# Patient Record
Sex: FEMALE | Race: WHITE
[De-identification: names, ages, dates, MRNs, and addresses within clinical notes are randomized per-mention and may not be internally consistent; named-entity substitution may affect disease eponyms.]

---

## 2019-10-17 ENCOUNTER — HOSPITAL ENCOUNTER (EMERGENCY)
Dept: HOSPITAL 63 - ER | Age: 29
Discharge: HOME | End: 2019-10-17
Payer: COMMERCIAL

## 2019-10-17 VITALS
SYSTOLIC BLOOD PRESSURE: 95 MMHG | SYSTOLIC BLOOD PRESSURE: 95 MMHG | DIASTOLIC BLOOD PRESSURE: 60 MMHG | DIASTOLIC BLOOD PRESSURE: 60 MMHG

## 2019-10-17 VITALS — WEIGHT: 116.62 LBS | BODY MASS INDEX: 19.43 KG/M2 | HEIGHT: 65 IN
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
## 2019-10-17 DIAGNOSIS — F41.8: ICD-10-CM

## 2019-10-17 DIAGNOSIS — T42.4X4A: Primary | ICD-10-CM

## 2019-10-17 LAB
ACETAMIN: < 2 MCG/ML (ref 10–30)
ALBUMIN SERPL-MCNC: 4 G/DL (ref 3.4–5)
ALBUMIN/GLOB SERPL: 1.1 {RATIO} (ref 1–1.7)
ALP SERPL-CCNC: 57 U/L (ref 46–116)
ALT SERPL-CCNC: 9 U/L (ref 14–59)
AMPHETAMINE/METHAMPHETAMINE: (no result)
ANION GAP SERPL CALC-SCNC: 9 MMOL/L (ref 6–14)
APTT PPP: YELLOW S
AST SERPL-CCNC: 13 U/L (ref 15–37)
BACTERIA #/AREA URNS HPF: 0 /HPF
BARBITURATES UR-MCNC: (no result) UG/ML
BASOPHILS # BLD AUTO: 0 X10^3/UL (ref 0–0.2)
BASOPHILS NFR BLD: 1 % (ref 0–3)
BENZODIAZ UR-MCNC: (no result) UG/L
BILIRUB SERPL-MCNC: 0.8 MG/DL (ref 0.2–1)
BILIRUB UR QL STRIP: (no result)
BUN/CREAT SERPL: 13 (ref 6–20)
CA-I SERPL ISE-MCNC: 9 MG/DL (ref 7–20)
CALCIUM SERPL-MCNC: 9.2 MG/DL (ref 8.5–10.1)
CANNABINOIDS UR-MCNC: (no result) UG/L
CHLORIDE SERPL-SCNC: 102 MMOL/L (ref 98–107)
CO2 SERPL-SCNC: 28 MMOL/L (ref 21–32)
COCAINE UR-MCNC: (no result) NG/ML
CREAT SERPL-MCNC: 0.7 MG/DL (ref 0.6–1)
EOSINOPHIL NFR BLD: 0 X10^3/UL (ref 0–0.7)
EOSINOPHIL NFR BLD: 1 % (ref 0–3)
ERYTHROCYTE [DISTWIDTH] IN BLOOD BY AUTOMATED COUNT: 13.1 % (ref 11.5–14.5)
ETHANOL SERPL-MCNC: < 10 MG/DL (ref 0–10)
FIBRINOGEN PPP-MCNC: CLEAR MG/DL
GFR SERPLBLD BASED ON 1.73 SQ M-ARVRAT: 97.6 ML/MIN
GLOBULIN SER-MCNC: 3.7 G/DL (ref 2.2–3.8)
GLUCOSE SERPL-MCNC: 91 MG/DL (ref 70–99)
GLUCOSE UR STRIP-MCNC: (no result) MG/DL
HCT VFR BLD CALC: 41.7 % (ref 36–47)
HGB BLD-MCNC: 13.9 G/DL (ref 12–15.5)
LYMPHOCYTES # BLD: 1.3 X10^3/UL (ref 1–4.8)
LYMPHOCYTES NFR BLD AUTO: 24 % (ref 24–48)
MAGNESIUM SERPL-MCNC: 1.8 MG/DL (ref 1.8–2.4)
MCH RBC QN AUTO: 30 PG (ref 25–35)
MCHC RBC AUTO-ENTMCNC: 33 G/DL (ref 31–37)
MCV RBC AUTO: 89 FL (ref 79–100)
METHADONE SERPL-MCNC: (no result) NG/ML
MONO #: 0.4 X10^3/UL (ref 0–1.1)
MONOCYTES NFR BLD: 8 % (ref 0–9)
NEUT #: 3.7 X10^3UL (ref 1.8–7.7)
NEUTROPHILS NFR BLD AUTO: 67 % (ref 31–73)
NITRITE UR QL STRIP: (no result)
OPIATES UR-MCNC: (no result) NG/ML
PCP SERPL-MCNC: (no result) MG/DL
PLATELET # BLD AUTO: 159 X10^3/UL (ref 140–400)
POTASSIUM SERPL-SCNC: 3.5 MMOL/L (ref 3.5–5.1)
PROT SERPL-MCNC: 7.7 G/DL (ref 6.4–8.2)
RBC # BLD AUTO: 4.66 X10^6/UL (ref 3.5–5.4)
RBC #/AREA URNS HPF: (no result) /HPF (ref 0–2)
SALIC: 1.8 MG/DL (ref 2.8–20)
SODIUM SERPL-SCNC: 139 MMOL/L (ref 136–145)
SP GR UR STRIP: <=1.005
SQUAMOUS #/AREA URNS LPF: (no result) /LPF
UROBILINOGEN UR-MCNC: 0.2 MG/DL
WBC # BLD AUTO: 5.5 X10^3/UL (ref 4–11)
WBC #/AREA URNS HPF: (no result) /HPF (ref 0–4)

## 2019-10-17 PROCEDURE — 36415 COLL VENOUS BLD VENIPUNCTURE: CPT

## 2019-10-17 PROCEDURE — 80307 DRUG TEST PRSMV CHEM ANLYZR: CPT

## 2019-10-17 PROCEDURE — 96360 HYDRATION IV INFUSION INIT: CPT

## 2019-10-17 PROCEDURE — 83735 ASSAY OF MAGNESIUM: CPT

## 2019-10-17 PROCEDURE — 85025 COMPLETE CBC W/AUTO DIFF WBC: CPT

## 2019-10-17 PROCEDURE — G0480 DRUG TEST DEF 1-7 CLASSES: HCPCS

## 2019-10-17 PROCEDURE — 99285 EMERGENCY DEPT VISIT HI MDM: CPT

## 2019-10-17 PROCEDURE — 93005 ELECTROCARDIOGRAM TRACING: CPT

## 2019-10-17 PROCEDURE — 96361 HYDRATE IV INFUSION ADD-ON: CPT

## 2019-10-17 PROCEDURE — 80329 ANALGESICS NON-OPIOID 1 OR 2: CPT

## 2019-10-17 PROCEDURE — 81001 URINALYSIS AUTO W/SCOPE: CPT

## 2019-10-17 PROCEDURE — 84702 CHORIONIC GONADOTROPIN TEST: CPT

## 2019-10-17 PROCEDURE — 80053 COMPREHEN METABOLIC PANEL: CPT

## 2019-10-17 NOTE — EKG
[facility] [address], [place] [address]

Test Date:    2019-10-17               Test Time:    10:47:16

Pat Name:     WINSTON HESTER           Department:   

Patient ID:   [id number]           Room:          

Gender:       F                        Technician:   

:          [id number]               Requested By: JADYN AIKEN

Order Number: [id number]            Reading MD:   Josesito De La Paz MD

                                 Measurements

Intervals                              Axis          

Rate:         88                       P:            65

MI:           188                      QRS:          96

QRSD:         90                       T:            58

QT:           364                                    

QTc:          444                                    

                           Interpretive Statements

SINUS RHYTHM



Electronically Signed On 10- 15:42:01 CDT by Josesito De La Paz MD

## 2019-10-17 NOTE — PHYS DOC
Adult General


Chief Complaint


Chief Complaint:  ANXIETY/PANIC ATTACK





HPI


HPI





Patient is a [age] year old [sex] who presents with []





Review of Systems


Review of Systems





Constitutional: Denies fever or chills []


Eyes: Denies change in visual acuity, redness, or eye pain []


HENT: Denies nasal congestion or sore throat []


Respiratory: Denies cough or shortness of breath []


Cardiovascular: No additional information not addressed in HPI []


GI: Denies abdominal pain, nausea, vomiting, bloody stools or diarrhea []


: Denies dysuria or hematuria []


Musculoskeletal: Denies back pain or joint pain []


Integument: Denies rash or skin lesions []


Neurologic: Denies headache, focal weakness or sensory changes []


Endocrine: Denies polyuria or polydipsia []





All other systems were reviewed and found to be within normal limits, except as 

documented in this note.





Current Medications


Current Medications





Current Medications








 Medications


  (Trade)  Dose


 Ordered  Sig/Frank  Start Time


 Stop Time Status Last Admin


Dose Admin


 


 Sodium Chloride  1,000 ml @ 


 1,000 mls/hr  1X  ONCE  10/17/19 10:45


 10/17/19 11:44 UNV  














Physical Exam


Physical Exam





Constitutional: Well developed, well nourished, no acute distress, non-toxic 

appearance. []


HENT: Normocephalic, atraumatic, bilateral external ears normal, oropharynx sal

st, no oral exudates, nose normal. []


Eyes: PERRLA, EOMI, conjunctiva normal, no discharge. [] 


Neck: Normal range of motion, no tenderness, supple, no stridor. [] 


Cardiovascular:Heart rate regular rhythm, no murmur []


Lungs & Thorax:  Bilateral breath sounds clear to auscultation []


Abdomen: Bowel sounds normal, soft, no tenderness, no masses, no pulsatile 

masses. [] 


Skin: Warm, dry, no erythema, no rash. [] 


Back: No tenderness, no CVA tenderness. [] 


Extremities: No tenderness, no cyanosis, no clubbing, ROM intact, no edema. [] 


Neurologic: Alert and oriented X 3, normal motor function, normal sensory 

function, no focal deficits noted. []


Psychologic: Affect normal, judgement normal, mood normal. []





EKG


EKG


@1047 NSR at 88bpm, NO ST elevation, incomplete RBBB, QRS 90ms, QT/QTc 364/444ms





Radiology/Procedures


Radiology/Procedures


[]





Course & Med Decision Making


Course & Med Decision Making


Pertinent Labs and Imaging studies reviewed. (See chart for details)





[]





Dragon Disclaimer


Dragon Disclaimer


This electronic medical record was generated, in whole or in part, using a voice

 recognition dictation system.





Departure


Departure:


Impression:  


   Primary Impression:  


   Anxiety and depression


   Additional Impression:  


   Benzodiazepine overdose of undetermined intent


Disposition:  01 HOME, SELF-CARE


Condition:  STABLE


Referrals:  


ROLAN PEREZ MD (PCP)


Patient Instructions:  Anxiety and Panic Attacks, Easy-to-Read, Depression, 

Adult, Easy-to-Read, Overdose, Adult, Suicidal Feelings, How to Help Yourself





Additional Instructions:  


Please take your medication as prescribed.  Please follow closely with your 

mental health professionals.





Problem Qualifiers








   Additional Impression:  


   Benzodiazepine overdose of undetermined intent


   Encounter type:  initial encounter  Qualified Codes:  T42.4X4A - Poisoning by

    benzodiazepines, undetermined, initial encounter








JADYN AIKEN DO             Oct 17, 2019 10:43 I have forwarded a prescription for Norvasc 2.5 mg tablets, one tablet twice a day for 3 weeks and then 2 tablets twice a day.

## 2020-09-18 ENCOUNTER — HOSPITAL ENCOUNTER (OUTPATIENT)
Dept: HOSPITAL 63 - US | Age: 30
Discharge: HOME | End: 2020-09-18
Attending: SPECIALIST
Payer: COMMERCIAL

## 2020-09-18 DIAGNOSIS — R22.43: Primary | ICD-10-CM

## 2020-09-18 PROCEDURE — 71046 X-RAY EXAM CHEST 2 VIEWS: CPT

## 2020-09-18 PROCEDURE — 93970 EXTREMITY STUDY: CPT

## 2020-09-18 NOTE — RAD
EXAM: CHEST PA   LATERAL

 

INDICATION: Reason: EDEMA / Spl. Instructions:  / History: .

 

TECHNIQUE: PA and lateral views

 

COMPARISON: None

 

FINDINGS:

 

The heart size is normal.

 

The great vessels appear unremarkable.

 

There is no hilar or mediastinal mass.

 

The lungs are clear.

 

There is no pleural effusion or pneumothorax.

 

There are no significant osseous abnormalities.

 

Bilateral breast implants are incidentally noted.

 

IMPRESSION:

 

No active cardiopulmonary disease.

 

Electronically signed by: Matilda Spangler MD (9/18/2020 4:00 PM) 

[id number]

## 2020-09-18 NOTE — RAD
EXAMINATION: VENOUS LOWER EXT BILATERAL (LOWER EXTREMITY VENOUS 

ULTRASOUND)

 

CLINICAL HISTORY: Bilateral lower extremity edema

 

TECHNIQUE: Sonographic grayscale images obtained of the bilateral lower 

extremity deep venous systems with color flow Doppler, compression, and 

augmentation techniques as indicated.  Images obtained and stored in a 

permanent archive.

 

COMPARISON:  None

 

 

FINDINGS:  

 

RIGHT:

No evidence of absent flow or incompressibility within the common femoral 

vein, femoral vein, or popliteal vein.  Visualized calf veins appear 

patent on limited evaluation.

 

LEFT:

No evidence of absent flow or incompressibility within common femoral 

vein, femoral vein, or popliteal vein.  Visualized calf veins appear 

patent on limited evaluation.

 

 

IMPRESSION:  

 

No evidence of bilateral lower extremity DVT.

 

Electronically signed by: Sotero Malin DO (9/18/2020 4:19 PM) [id number]

## 2020-11-24 ENCOUNTER — HOSPITAL ENCOUNTER (OUTPATIENT)
Dept: HOSPITAL 61 - ECHO | Age: 30
End: 2020-11-24
Payer: COMMERCIAL

## 2020-11-24 DIAGNOSIS — R06.00: Primary | ICD-10-CM

## 2020-11-24 DIAGNOSIS — Z87.891: ICD-10-CM

## 2020-11-24 DIAGNOSIS — R60.0: ICD-10-CM

## 2020-11-24 PROCEDURE — 93306 TTE W/DOPPLER COMPLETE: CPT

## 2020-11-24 NOTE — CARD
MR#: [id number]

Account#: [id number]

Accession#: [id number]

Date of Study: 11/24/2020

Ordering Physician: ROLAN PEREZ, 

Referring Physician: ROLAN PEREZ, 

Tech: Liz Recio





--------------- APPROVED REPORT --------------





EXAM: Two-dimensional and M-mode echocardiogram with Doppler and color Doppler.



Other Information 

Quality : AverageHR: 83bpm



INDICATION

Dyspnea 

Edema



RISK FACTORS

Smoking 



2D DIMENSIONS 

RVDd2.2 (2.9-3.5cm)Left Atrium(2D)2.7 (1.6-4.0cm)

IVSd0.9 (0.7-1.1cm)Aortic Root(2D)2.9 (2.0-3.7cm)

LVDd4.4 (3.9-5.9cm)LVOT Diameter2.0 (1.8-2.4cm)

PWd0.8 (0.7-1.1cm)LVDs3.0 (2.5-4.0cm)

FS (%) 32.7 %SV54.9 ml



Aortic Valve

AoV Peak Dipesh.97.5cm/sAoV VTI24.6cm

AO Peak GR.3.8mmHgLVOT Peak Dipesh.81.6cm/s

LVOT  VTI 19.02cmAO Mean GR.3mmHg

WILFRID (VMAX)2.01wq9AHB   (VTI)2.45cm2



Mitral Valve

MV E Eujlcqan11.1cm/sMV DECEL [id number]

MV A Zjeozvaw56.7cm/sMV [id number]

E/A  Ratio1.4MVA (PHT)3.91cm2



TDI

E/Lateral E'7.4E/Medial E'7.7



Pulmonary Valve

PV Peak Hpfnobqv52.8cm/sPV Peak Grad.2mmHg



Tricuspid Valve

TR P. Xouqeosb353ma/sRAP ZROPTYXS0ehCi

TR Peak Gr.66kcPjXZRI56rtUs



Pulmonary Vein

S1 Iwyuehgq53.2cm/sD2 Bmwahojl42.1cm/s

PVa [id number]



 LEFT VENTRICLE 

The left ventricle is normal size. There is normal left ventricular wall thickness. The left ventricu
lar systolic function is normal and the ejection fraction is within normal range. The Ejection Fracti
on is 50-55%. There is normal LV segmental wall motion. The left ventricular diastolic function and f
illing is normal for age.



 RIGHT VENTRICLE 

The right ventricle is normal size. There is normal right ventricular wall thickness. The right ventr
icular systolic function is normal.



 ATRIA 

The left atrium size is normal. The right atrium size is normal. The interatrial septum is intact wit
h no evidence for an atrial septal defect or patent foramen ovale as noted on 2-D or Doppler imaging.




 AORTIC VALVE 

The aortic valve is normal in structure and function. Doppler and Color Flow revealed no significant 
aortic regurgitation. There is no significant aortic valvular stenosis.



 MITRAL VALVE 

The mitral valve is normal in structure and function. There is no evidence of mitral valve prolapse. 
There is no mitral valve stenosis. Doppler and Color-flow revealed trace mitral regurgitation.



 TRICUSPID VALVE 

The tricuspid valve is normal in structure and function. Doppler and Color Flow revealed trace tricus
pid regurgitation with an estimated PAP of 20 mmHg. There is no tricuspid valve stenosis.



 PULMONIC VALVE 

The pulmonic valve is not well visualized. Doppler and Color Flow revealed trace pulmonic valvular re
gurgitation.



 GREAT VESSELS 

The aortic root is normal in size. The IVC is normal in size and collapses >50% with inspiration.



 PERICARDIAL EFFUSION 

There is no evidence of significant pericardial effusion.



Critical Notification

Critical Value: No



<Conclusion>

The left ventricle is normal size.

The left ventricular systolic function is normal and the ejection fraction is within normal range.

The Ejection Fraction is 50-55%.

There is normal LV segmental wall motion.

Doppler and Color Flow revealed no significant aortic regurgitation.

There is no significant aortic valvular stenosis.

Doppler and Color-flow revealed trace mitral regurgitation.

Doppler and Color Flow revealed trace tricuspid regurgitation with an estimated PAP of 20 mmHg.



Signed by : Eddie Drew MD

Electronically Approved : 11/24/2020 17:36:49